# Patient Record
Sex: FEMALE | Race: BLACK OR AFRICAN AMERICAN | NOT HISPANIC OR LATINO | Employment: OTHER | URBAN - METROPOLITAN AREA
[De-identification: names, ages, dates, MRNs, and addresses within clinical notes are randomized per-mention and may not be internally consistent; named-entity substitution may affect disease eponyms.]

---

## 2017-03-13 ENCOUNTER — OFFICE VISIT (OUTPATIENT)
Dept: OPHTHALMOLOGY | Facility: CLINIC | Age: 72
End: 2017-03-13
Payer: COMMERCIAL

## 2017-03-13 DIAGNOSIS — H25.013 CORTICAL AGE-RELATED CATARACT OF BOTH EYES: ICD-10-CM

## 2017-03-13 DIAGNOSIS — E11.9 TYPE 2 DIABETES MELLITUS WITHOUT COMPLICATION, WITHOUT LONG-TERM CURRENT USE OF INSULIN: Primary | ICD-10-CM

## 2017-03-13 PROCEDURE — 99999 PR PBB SHADOW E&M-EST. PATIENT-LVL II: CPT | Mod: PBBFAC,,, | Performed by: OPHTHALMOLOGY

## 2017-03-13 PROCEDURE — 92014 COMPRE OPH EXAM EST PT 1/>: CPT | Mod: S$GLB,,, | Performed by: OPHTHALMOLOGY

## 2017-03-13 RX ORDER — ERGOCALCIFEROL 1.25 MG/1
50000 CAPSULE ORAL
COMMUNITY
Start: 2015-06-17 | End: 2019-01-15

## 2017-03-13 NOTE — PROGRESS NOTES
===============================  Radha Lao,   71 y.o. female   Last visit [facility]: :3/7/2016   Last visit eye dept. Visit date not found  VA:  Corrected distance visual acuity was 20/200 in the right eye and 20/60 in the left eye.  Tonometry     Tonometry (Applanation, 8:22 AM)      Right Left   Pressure 16 16                 Wearing Rx     Wearing Rx      Sphere Cylinder Axis Add   Right +1.00 +1.00 140 +2.50   Left +0.25 +1.25 040 +2.50       Type:  PAL              Manifest Refraction     Manifest Refraction      Sphere Cylinder Axis Dist   Right +0.50 +0.50 140 20/40   Left +1.25 +0.25 180 20/40                 Chief Complaint   Patient presents with    Diabetic Eye Exam     yearly diabetic exam        HPI     Diabetic Eye Exam    Additional comments: yearly diabetic exam           Comments   Dm x 11 yrs bs 106  No sx's  asymptomatic cataratcs Mild moderate  Dm -dr       Last edited by Anjelica Guthrie on 3/13/2017  8:00 AM. (History)      Read Studies:   Vitals    ________________  3/13/2017  1. Type 2 diabetes mellitus without complication, without long-term current use of insulin    2. Cortical age-related cataract of both eyes      No DR  Significant cataracts  Recommend Eval with Dr. Cotto next available  rtc with me 6 months Post CE       ===============================

## 2017-03-13 NOTE — MR AVS SNAPSHOT
[facility] - Ophthalmology  [address] [facility] [place] [address]  Phone: [phone]  Fax: [phone]                  Radha Lao   3/13/2017 8:15 AM   Office Visit    Description:  Female : 1945   Provider:  BLAYNE Christine MD   Department:  [facility] - Ophthalmology           Reason for Visit     Diabetic Eye Exam           Diagnoses this Visit        Comments    Type 2 diabetes mellitus without complication, without long-term current use of insulin    -  Primary     Cortical age-related cataract of both eyes                To Do List           Goals (5 Years of Data)     None      Follow-Up and Disposition     Return if symptoms worsen or fail to improve.      [facility] On Call     [facility] On Call Nurse Care Line -  Assistance  Registered nurses in the [facility] On Call Center provide clinical advisement, health education, appointment booking, and other advisory services.  Call for this free service at [phone].             Medications           Message regarding Medications     Verify the changes and/or additions to your medication regime listed below are the same as discussed with your clinician today.  If any of these changes or additions are incorrect, please notify your healthcare provider.             Verify that the below list of medications is an accurate representation of the medications you are currently taking.  If none reported, the list may be blank. If incorrect, please contact your healthcare provider. Carry this list with you in case of emergency.           Current Medications     ergocalciferol (ERGOCALCIFEROL) 50,000 unit Cap Take 50,000 Units by mouth.    glipiZIDE (GLUCOTROL) 5 MG TR24     lancets (ONE TOUCH ULTRASOFT LANCETS) Misc One touch ultra lancets Test qd    losartan (COZAAR) 100 MG tablet     metformin (GLUCOPHAGE) 500 MG tablet     multivit-min-iron-CA-FA 27-0.4 mg Tab Take by mouth.    multivit-min-iron-CA-FA 27-0.4 mg Tab Take by mouth.    pravastatin  (PRAVACHOL) 40 MG tablet     pravastatin (PRAVACHOL) 40 MG tablet Take 40 mg by mouth.    estradiol (ESTRACE) 0.01 % (0.1 mg/gram) vaginal cream Place 2 g vaginally.           Clinical Reference Information           Allergies as of 3/13/2017     Iodine And Iodide Containing Products    Other Omega-3s      Immunizations Administered on Date of Encounter - 3/13/2017     None      Language Assistance Services     ATTENTION: Language assistance services are available, free of charge. Please call [phone].      ATENCIÓN: Si hong castillo, tiene a clifford disposición servicios gratuitos de asistencia lingüística. Llame al [phone].     [facility] Ý: N?u b?n nói Ti?ng Vi?t, có các d?ch v? h? tr? ngôn ng? mi?n phí dành cho b?n. G?i s? [phone].         [facility] - Ophthalmology complies with applicable Federal civil rights laws and does not discriminate on the basis of race, color, national origin, age, disability, or sex.

## 2017-03-27 ENCOUNTER — OFFICE VISIT (OUTPATIENT)
Dept: OPHTHALMOLOGY | Facility: CLINIC | Age: 72
End: 2017-03-27
Payer: COMMERCIAL

## 2017-03-27 DIAGNOSIS — H25.13 NUCLEAR SCLEROSIS, BILATERAL: Primary | ICD-10-CM

## 2017-03-27 DIAGNOSIS — E11.9 DIABETES MELLITUS TYPE 2 WITHOUT RETINOPATHY: ICD-10-CM

## 2017-03-27 PROCEDURE — 92014 COMPRE OPH EXAM EST PT 1/>: CPT | Mod: S$GLB,,, | Performed by: OPHTHALMOLOGY

## 2017-03-27 PROCEDURE — 99999 PR PBB SHADOW E&M-EST. PATIENT-LVL I: CPT | Mod: PBBFAC,,, | Performed by: OPHTHALMOLOGY

## 2017-03-27 NOTE — PROGRESS NOTES
SUBJECTIVE:   Radha Lao is a 71 y.o. female   Corrected distance visual acuity was 20/200+1 in the right eye and 20/60 in the left eye.Corrected near visual acuity was J3 using both eyes.   Chief Complaint   Patient presents with    Cataract Eval     ref by [facility]        HPI:  HPI     Cataract Eval    Additional comments: ref by [facility]           Comments   Patient states her VA is blurry, but still comfortable to her. No problems   driving at night and no glare issues. BS stays around 100. No pain /   irritation. No gtts. Patient is not sure if she is ready for cataract sx.   Wants to talk to CPG more. Will get ASCAN done in case patient does want   to go on with sx.    Dm x 11 yrs bs 106  No sx's  asymptomatic cataratcs Mild moderate  Dm -dr       Last edited by Robbin Aguayo MA on 3/27/2017  8:53 AM. (History)        Assessment /Plan :  1. Nuclear sclerosis, bilateral discussed RBA of cataract extraction OD, patient will consider the surgery   2. Diabetes mellitus type 2 without retinopathy No diabetic retinopathy at this time. Reviewed diabetic eye precautions including avoiding tobacco use,  Good glucose control, and importance of regular follow up.        Patient will call to schedule an appointment

## 2018-11-19 ENCOUNTER — OFFICE VISIT (OUTPATIENT)
Dept: OPHTHALMOLOGY | Facility: CLINIC | Age: 73
End: 2018-11-19
Payer: COMMERCIAL

## 2018-11-19 DIAGNOSIS — H25.12 NUCLEAR SENILE CATARACT OF LEFT EYE: ICD-10-CM

## 2018-11-19 DIAGNOSIS — H25.11 NUCLEAR SENILE CATARACT OF RIGHT EYE: Primary | ICD-10-CM

## 2018-11-19 DIAGNOSIS — E11.9 DIABETES MELLITUS TYPE 2 WITHOUT RETINOPATHY: ICD-10-CM

## 2018-11-19 PROCEDURE — 92136 OPHTHALMIC BIOMETRY: CPT | Mod: RT,S$GLB,, | Performed by: OPHTHALMOLOGY

## 2018-11-19 PROCEDURE — 92014 COMPRE OPH EXAM EST PT 1/>: CPT | Mod: S$GLB,,, | Performed by: OPHTHALMOLOGY

## 2018-11-19 PROCEDURE — 99999 PR PBB SHADOW E&M-EST. PATIENT-LVL II: CPT | Mod: PBBFAC,,, | Performed by: OPHTHALMOLOGY

## 2018-11-19 RX ORDER — KETOROLAC TROMETHAMINE 5 MG/ML
1 SOLUTION OPHTHALMIC 4 TIMES DAILY
Qty: 1 BOTTLE | Refills: 2 | Status: SHIPPED | OUTPATIENT
Start: 2018-11-19 | End: 2019-01-15

## 2018-11-19 RX ORDER — PREDNISOLONE ACETATE 10 MG/ML
1 SUSPENSION/ DROPS OPHTHALMIC 4 TIMES DAILY
Qty: 1 BOTTLE | Refills: 2 | Status: SHIPPED | OUTPATIENT
Start: 2018-11-19 | End: 2019-01-15 | Stop reason: ALTCHOICE

## 2018-11-19 RX ORDER — GATIFLOXACIN 5 MG/ML
1 SOLUTION/ DROPS OPHTHALMIC 2 TIMES DAILY
Qty: 1 BOTTLE | Refills: 2 | Status: SHIPPED | OUTPATIENT
Start: 2018-11-19 | End: 2019-01-15 | Stop reason: ALTCHOICE

## 2018-11-19 NOTE — PROGRESS NOTES
SUBJECTIVE:   Radha Lao is a 73 y.o. female   Corrected distance visual acuity was CF at 5' in the right eye and 20/50 +1 in the left eye.   Chief Complaint   Patient presents with    Cataract        HPI:  HPI     Pt here for cataract eval. She says that every now and then, she feels   some burning and itching above her right eye. Pt states that she has   noticed that her right eye has gotten weaker since her last appt. She says   when she closes her left eye, she notices that her left eye is the one   that she can really see out of. No gtts.     Dm x 11 yrs bs 106  No sx's  asymptomatic cataratcs Mild moderate  Dm -dr    Last edited by Alexi Cook, Patient Care Assistant on 11/19/2018 10:12   AM. (History)        Assessment /Plan :  1. Nuclear senile cataract of right eye  Visually Significant Cataract OD > OS:   Patient reports decreased vision consistent with the clinical amount of lenticular opacity,  which reaches the level of visual significance and affects activities of daily living such as  drive. Risks, benefits, and alternatives to cataract surgery were  discussed.  IOL options were discussed, including Premium IOL'S and the associated  side effects and additional patient cost associated with them as well as patient's visual  goals. The pt expressed a desire to proceed with surgery with the potential for some  reasonable degree of visual improvement and was consented.  Risks of loss of vision and eye reviewed as well as possibility of need for spectacle correction after surgery even with premium implants. Verbal and written preop  instructions were provided to the patient.       Pt is interested in traditional monofocal IOL aiming for:    Distance OU and understands that glasses will be generally needed at all times for near vision and often for finer distance correction especially for higher degrees of astigmatism.       Final visual acuity may be limited by diabetes    Pt wishes to have  Phaco/IOL  OD     Requests a Monofocal IOL.    Will aim for distance    Other considerations: Corneal Precautions, complex, trypan blue                 2. Nuclear senile cataract of left eye -follow, will do OD first.    3. Diabetes mellitus type 2 without retinopathy No diabetic retinopathy at this time. Reviewed diabetic eye precautions including avoiding tobacco use,  Good glucose control, and importance of regular follow up.            RTC for phaco OD

## 2018-11-23 ENCOUNTER — TELEPHONE (OUTPATIENT)
Dept: OPHTHALMOLOGY | Facility: CLINIC | Age: 73
End: 2018-11-23

## 2018-11-23 NOTE — TELEPHONE ENCOUNTER
----- Message from Sandee Hirsch MA sent at 11/23/2018  4:04 PM CST -----  Contact: Pt   Please call Ms. Lao #[phone], she wants to know how to take Pre Op eye drops.    Thank you,  Sandee

## 2018-11-23 NOTE — TELEPHONE ENCOUNTER
----- Message from Sandee Hirsch MA sent at 11/23/2018  4:04 PM CST -----  Contact: Pt   Please call Ms. Lao #[phone], she wants to know how to take Pre Op eye drops.    Thank you,  Sandee

## 2018-12-05 ENCOUNTER — OUTSIDE PLACE OF SERVICE (OUTPATIENT)
Dept: ADMINISTRATIVE | Facility: OTHER | Age: 73
End: 2018-12-05
Payer: COMMERCIAL

## 2018-12-05 PROCEDURE — 66982 XCAPSL CTRC RMVL CPLX WO ECP: CPT | Mod: RT,,, | Performed by: OPHTHALMOLOGY

## 2018-12-06 ENCOUNTER — OFFICE VISIT (OUTPATIENT)
Dept: OPHTHALMOLOGY | Facility: CLINIC | Age: 73
End: 2018-12-06
Payer: COMMERCIAL

## 2018-12-06 DIAGNOSIS — Z98.890 POST-OPERATIVE STATE: Primary | ICD-10-CM

## 2018-12-06 PROCEDURE — 99024 POSTOP FOLLOW-UP VISIT: CPT | Mod: S$GLB,,, | Performed by: OPHTHALMOLOGY

## 2018-12-06 PROCEDURE — 99999 PR PBB SHADOW E&M-EST. PATIENT-LVL II: CPT | Mod: PBBFAC,,, | Performed by: OPHTHALMOLOGY

## 2018-12-13 ENCOUNTER — OFFICE VISIT (OUTPATIENT)
Dept: OPHTHALMOLOGY | Facility: CLINIC | Age: 73
End: 2018-12-13
Payer: COMMERCIAL

## 2018-12-13 DIAGNOSIS — Z98.890 POST-OPERATIVE STATE: Primary | ICD-10-CM

## 2018-12-13 PROCEDURE — 99024 POSTOP FOLLOW-UP VISIT: CPT | Mod: S$GLB,,, | Performed by: OPHTHALMOLOGY

## 2018-12-13 PROCEDURE — 99999 PR PBB SHADOW E&M-EST. PATIENT-LVL II: CPT | Mod: PBBFAC,,, | Performed by: OPHTHALMOLOGY

## 2018-12-13 NOTE — PROGRESS NOTES
SUBJECTIVE:   Radha Lao is a 73 y.o. female   Uncorrected distance visual acuity was 20/30 in the right eye and not recorded in the left eye.   Chief Complaint   Patient presents with    Post-op Evaluation        HPI:  HPI     Patient returns for a week p.o. Visit patient states she is seeing in   high definition, patient is undecided if shes ready to book OS.        Dm x 11 yrs bs 106    PCIOL OD +23.0 SN60WF (distance) 12-5-18  Cat OS    OD Pred Acet. Qid , Ket Qid, Gat Bid           Last edited by VIKRAM Monson on 12/13/2018  1:14 PM. (History)        Assessment /Plan :  1. Post-operative state Slit lamp exam:  L/L: nl  K: clear, wound sealed  AC: 0 cell and flare  Iris/Lens: IOL centered and stable      IMP:  PO Week 1 S/P Phaco/ IOL OD : Doing well with no evidence of infection or abnormal inflammation.     Plan:  Pt given and instructed in one week PO instructions. D/C zymaxid and start to taper off Ketorlac and Pred Forte 1% weekly. Can resume normal activitites and d/c eye shield. OTC reading glasses can be used until evaluated for final MR. Follow up in one month with Dr. Christine or PRN pain, redness, vision loss, or other concerns.

## 2019-01-15 ENCOUNTER — OFFICE VISIT (OUTPATIENT)
Dept: OPHTHALMOLOGY | Facility: CLINIC | Age: 74
End: 2019-01-15
Payer: COMMERCIAL

## 2019-01-15 DIAGNOSIS — H25.12 AGE-RELATED NUCLEAR CATARACT OF LEFT EYE: ICD-10-CM

## 2019-01-15 DIAGNOSIS — E11.9 TYPE 2 DIABETES MELLITUS WITHOUT COMPLICATION, WITHOUT LONG-TERM CURRENT USE OF INSULIN: Primary | ICD-10-CM

## 2019-01-15 PROCEDURE — 99999 PR PBB SHADOW E&M-EST. PATIENT-LVL II: ICD-10-PCS | Mod: PBBFAC,,, | Performed by: OPHTHALMOLOGY

## 2019-01-15 PROCEDURE — 92014 COMPRE OPH EXAM EST PT 1/>: CPT | Mod: S$GLB,,, | Performed by: OPHTHALMOLOGY

## 2019-01-15 PROCEDURE — 99999 PR PBB SHADOW E&M-EST. PATIENT-LVL II: CPT | Mod: PBBFAC,,, | Performed by: OPHTHALMOLOGY

## 2019-01-15 PROCEDURE — 92014 PR EYE EXAM, EST PATIENT,COMPREHESV: ICD-10-PCS | Mod: S$GLB,,, | Performed by: OPHTHALMOLOGY

## 2019-01-15 NOTE — PROGRESS NOTES
===============================  01/15/2019   Radha Lao,   73 y.o. female   Last visit [facility]: :3/13/2017   Last visit eye dept. Visit date not found  VA:  Uncorrected distance visual acuity was 20/20 in the right eye and 20/50 in the left eye.  Tonometry     Tonometry (Applanation, 2:09 PM)       Right Left    Pressure 18 20               Not recorded        Manifest Refraction     Manifest Refraction       Sphere Cylinder Axis Dist VA    Right -0.50   20/20    Left -0.25 +1.25 038 20/25              Chief Complaint   Patient presents with    Diabetes     S/P CAT SX OD WITH CPG 12-5-18    POST IOP     vision is good per pt     Ophthalmic Medications     Ophthalmic - Anti-inflammatory, Glucocorticoids Start End    prednisoLONE acetate (PRED FORTE) 1 % DrpS (Discontinued) 11/19/2018 1/15/2019    Sig: Place 1 drop into the right eye 4 (four) times daily. Start one day before surgery    Route: Right Eye    Reason for Discontinue: Therapy completed    Ophthalmic - Anti-inflammatory, NSAIDs Start End    ketorolac 0.5% (ACULAR) 0.5 % Drop (Discontinued) 11/19/2018 1/15/2019    Sig: Place 1 drop into the right eye 4 (four) times daily. Eyedrops to start one day before surgery    Route: Right Eye    Reason for Discontinue: Patient no longer taking         HPI     Diabetes      Additional comments: S/P CAT SX OD WITH CPG 12-5-18              POST IOP      Additional comments: vision is good per pt              Comments     Dm since 2006  NO DBR  PCIOL OD +23.0 SN60WF (distance) 12-5-18  Cat OS          Last edited by SWETHA Alcantara on 1/15/2019  1:52 PM. (History)          ________________  1/15/2019  Problem List Items Addressed This Visit        Eye/Vision problems    Diabetes mellitus - Primary    Age-related nuclear cataract of left eye        Dm 1 2years   recent od c e   following os  cat  Oct go hola  Very pleased with 5 week post op ce  3+ vac cat OS, follow for now  rtc with me in 6 m      .        ===========================

## 2019-07-16 ENCOUNTER — OFFICE VISIT (OUTPATIENT)
Dept: OPHTHALMOLOGY | Facility: CLINIC | Age: 74
End: 2019-07-16
Payer: COMMERCIAL

## 2019-07-16 DIAGNOSIS — H25.812 COMBINED FORMS OF AGE-RELATED CATARACT OF LEFT EYE: ICD-10-CM

## 2019-07-16 DIAGNOSIS — E11.9 TYPE 2 DIABETES MELLITUS WITHOUT COMPLICATION, WITHOUT LONG-TERM CURRENT USE OF INSULIN: Primary | ICD-10-CM

## 2019-07-16 DIAGNOSIS — Z96.1 PSEUDOPHAKIA OF RIGHT EYE: ICD-10-CM

## 2019-07-16 PROCEDURE — 92014 COMPRE OPH EXAM EST PT 1/>: CPT | Mod: S$GLB,,, | Performed by: OPHTHALMOLOGY

## 2019-07-16 PROCEDURE — 99999 PR PBB SHADOW E&M-EST. PATIENT-LVL II: ICD-10-PCS | Mod: PBBFAC,,, | Performed by: OPHTHALMOLOGY

## 2019-07-16 PROCEDURE — 92014 PR EYE EXAM, EST PATIENT,COMPREHESV: ICD-10-PCS | Mod: S$GLB,,, | Performed by: OPHTHALMOLOGY

## 2019-07-16 PROCEDURE — 99999 PR PBB SHADOW E&M-EST. PATIENT-LVL II: CPT | Mod: PBBFAC,,, | Performed by: OPHTHALMOLOGY

## 2019-07-16 RX ORDER — TRIAMCINOLONE ACETONIDE 1 MG/G
CREAM TOPICAL
COMMUNITY
Start: 2019-06-10 | End: 2020-06-09

## 2019-07-16 NOTE — PROGRESS NOTES
===============================  Radha Lao,   73 y.o. female   Last visit [facility]: :1/15/2019   Last visit eye dept. Visit date not found  VA:  Uncorrected distance visual acuity was 20/25 in the right eye and 20/50 in the left eye.  Tonometry     Tonometry (Applanation, 1:35 PM)       Right Left    Pressure 14 14               Not recorded         Not recorded         Not recorded        Chief Complaint   Patient presents with    Diabetes     6 months diabetic check up          ________________  7/16/2019  HPI     Diabetes      Additional comments: 6 months diabetic check up              Comments     Dm since 2006  NO DBR  PCIOL OD +23.0 SN60WF (distance) 12-5-18  Cat OS          Last edited by Anjelica Guthrie on 7/16/2019  1:26 PM. (History)      Problem List Items Addressed This Visit        Eye/Vision problems    Diabetes mellitus - Primary    Combined forms of age-related cataract of left eye    Pseudophakia of right eye          .stable  Significant cataract, asymptomatic, follow  No bdr  rtc 1 year       ===========================

## 2020-07-14 ENCOUNTER — OFFICE VISIT (OUTPATIENT)
Dept: OPHTHALMOLOGY | Facility: CLINIC | Age: 75
End: 2020-07-14
Payer: COMMERCIAL

## 2020-07-14 DIAGNOSIS — H25.812 COMBINED FORMS OF AGE-RELATED CATARACT OF LEFT EYE: ICD-10-CM

## 2020-07-14 DIAGNOSIS — E11.36 CONTROLLED TYPE 2 DIABETES MELLITUS WITH DIABETIC CATARACT, WITHOUT LONG-TERM CURRENT USE OF INSULIN: Primary | ICD-10-CM

## 2020-07-14 PROCEDURE — 99999 PR PBB SHADOW E&M-EST. PATIENT-LVL II: CPT | Mod: PBBFAC,,, | Performed by: OPHTHALMOLOGY

## 2020-07-14 PROCEDURE — 92014 COMPRE OPH EXAM EST PT 1/>: CPT | Mod: S$GLB,,, | Performed by: OPHTHALMOLOGY

## 2020-07-14 PROCEDURE — 92014 PR EYE EXAM, EST PATIENT,COMPREHESV: ICD-10-PCS | Mod: S$GLB,,, | Performed by: OPHTHALMOLOGY

## 2020-07-14 PROCEDURE — 99999 PR PBB SHADOW E&M-EST. PATIENT-LVL II: ICD-10-PCS | Mod: PBBFAC,,, | Performed by: OPHTHALMOLOGY

## 2020-07-14 RX ORDER — AMLODIPINE BESYLATE 5 MG/1
5 TABLET ORAL
COMMUNITY
Start: 2020-01-13 | End: 2021-01-12

## 2020-07-14 NOTE — PROGRESS NOTES
===============================  Radha Lao,  7/14/2020 today   74 y.o. female   Last visit [facility]: :7/16/2019   Last visit eye dept. Visit date not found  VA:  Uncorrected distance visual acuity was 20/20 in the right eye and 20/50 in the left eye.  Tonometry     Tonometry (Applanation, 2:03 PM)       Right Left    Pressure 21 21               Not recorded         Not recorded         Not recorded        Chief Complaint   Patient presents with    Diabetic Eye Exam     pt states her va is good, here for yrly dm eye exam    cataracts       ________________  7/14/2020 today  HPI     Diabetic Eye Exam      Additional comments: pt states her va is good, here for yr dm eye exam                Comments     Dm since 2006  NO DBR  PCIOL OD +23.0 SN60WF (distance) 12-5-18  Cat OS          Last edited by SWETHA Alcantara on 7/14/2020  2:00 PM. (History)      Problem List Items Addressed This Visit        Eye/Vision problems    Combined forms of age-related cataract of left eye      Other Visit Diagnoses     Controlled type 2 diabetes mellitus with diabetic cataract, without long-term current use of insulin    -  Primary          .significant cataract os but asymptomatic  pciol od trace pcm  Follow  No BDr  rtc 1 year      ===========================

## 2021-07-26 ENCOUNTER — OFFICE VISIT (OUTPATIENT)
Dept: OPHTHALMOLOGY | Facility: CLINIC | Age: 76
End: 2021-07-26
Payer: MEDICARE

## 2021-07-26 DIAGNOSIS — E11.36 CONTROLLED TYPE 2 DIABETES MELLITUS WITH DIABETIC CATARACT, WITHOUT LONG-TERM CURRENT USE OF INSULIN: Primary | ICD-10-CM

## 2021-07-26 PROCEDURE — 92014 PR EYE EXAM, EST PATIENT,COMPREHESV: ICD-10-PCS | Mod: S$GLB,,, | Performed by: OPHTHALMOLOGY

## 2021-07-26 PROCEDURE — 99999 PR PBB SHADOW E&M-EST. PATIENT-LVL III: CPT | Mod: PBBFAC,,, | Performed by: OPHTHALMOLOGY

## 2021-07-26 PROCEDURE — 99213 OFFICE O/P EST LOW 20 MIN: CPT | Mod: PBBFAC | Performed by: OPHTHALMOLOGY

## 2021-07-26 PROCEDURE — 92014 COMPRE OPH EXAM EST PT 1/>: CPT | Mod: S$GLB,,, | Performed by: OPHTHALMOLOGY

## 2021-07-26 PROCEDURE — 99999 PR PBB SHADOW E&M-EST. PATIENT-LVL III: ICD-10-PCS | Mod: PBBFAC,,, | Performed by: OPHTHALMOLOGY

## 2022-09-19 NOTE — PROGRESS NOTES
===============================  Date today is 9/20/2022  Radha Lao is a 77 y.o. female  Last visit [facility]: :7/26/2021   Last visit eye dept. Visit date not found    Uncorrected distance visual acuity was 20/25 in the right eye and 20/50 in the left eye.  Tonometry       Tonometry (Tonopen, 10:24 AM)         Right Left    Pressure 16 17                  Not recorded       Manifest Refraction       Manifest Refraction         Sphere Cylinder Axis Dist VA    Right        Left +0.50 +0.25 037 20/50-                  Not recorded       Chief Complaint   Patient presents with    Diabetic Eye Exam     HPI    Diagnosed with diabetes in   No results found for: LABA1C, HGBA1C  Pt states glasses are only good for reading now and would like a new rx  Pt states no visual co's  Pt uses refresh prn ou  No pain          Last edited by Tank Marte on 9/20/2022 10:10 AM.      Problem List Items Addressed This Visit    None  Visit Diagnoses       Controlled type 2 diabetes mellitus without complication, without long-term current use of insulin    -  Primary    Diabetic cataract of left eye        Cataract extraction status of eye, right              Instructed to call 24/7 for any worsening of vision, visual distortion or pain.  Check OU independently daily.    Gave my office and personal cell phone number.  ________________  9/20/2022 today  Radha Lao  :  DM no retinopathy   OCT no change  2+ cortical/vacuole cataract OS- asymptomatic  PCIOL OD  C/d 0.35 OU  Macula looks good    RTC 1 year  Instructed to call 24/7 for any worsening of vision or symptoms. Check OU daily.   Gave my office and cell phone number.      =============================

## 2022-09-20 ENCOUNTER — OFFICE VISIT (OUTPATIENT)
Dept: OPHTHALMOLOGY | Facility: CLINIC | Age: 77
End: 2022-09-20
Payer: MEDICARE

## 2022-09-20 DIAGNOSIS — Z98.41 CATARACT EXTRACTION STATUS OF EYE, RIGHT: ICD-10-CM

## 2022-09-20 DIAGNOSIS — E11.9 CONTROLLED TYPE 2 DIABETES MELLITUS WITHOUT COMPLICATION, WITHOUT LONG-TERM CURRENT USE OF INSULIN: Primary | ICD-10-CM

## 2022-09-20 DIAGNOSIS — E11.36 DIABETIC CATARACT OF LEFT EYE: ICD-10-CM

## 2022-09-20 PROCEDURE — 99999 PR PBB SHADOW E&M-EST. PATIENT-LVL I: CPT | Mod: PBBFAC,,, | Performed by: OPHTHALMOLOGY

## 2022-09-20 PROCEDURE — 1159F PR MEDICATION LIST DOCUMENTED IN MEDICAL RECORD: ICD-10-PCS | Mod: CPTII,S$GLB,, | Performed by: OPHTHALMOLOGY

## 2022-09-20 PROCEDURE — 92014 PR EYE EXAM, EST PATIENT,COMPREHESV: ICD-10-PCS | Mod: S$GLB,,, | Performed by: OPHTHALMOLOGY

## 2022-09-20 PROCEDURE — 1159F MED LIST DOCD IN RCRD: CPT | Mod: CPTII,S$GLB,, | Performed by: OPHTHALMOLOGY

## 2022-09-20 PROCEDURE — 1160F RVW MEDS BY RX/DR IN RCRD: CPT | Mod: CPTII,S$GLB,, | Performed by: OPHTHALMOLOGY

## 2022-09-20 PROCEDURE — 1160F PR REVIEW ALL MEDS BY PRESCRIBER/CLIN PHARMACIST DOCUMENTED: ICD-10-PCS | Mod: CPTII,S$GLB,, | Performed by: OPHTHALMOLOGY

## 2022-09-20 PROCEDURE — 99999 PR PBB SHADOW E&M-EST. PATIENT-LVL I: ICD-10-PCS | Mod: PBBFAC,,, | Performed by: OPHTHALMOLOGY

## 2022-09-20 PROCEDURE — 92014 COMPRE OPH EXAM EST PT 1/>: CPT | Mod: S$GLB,,, | Performed by: OPHTHALMOLOGY

## 2023-09-26 ENCOUNTER — OFFICE VISIT (OUTPATIENT)
Dept: OPHTHALMOLOGY | Facility: CLINIC | Age: 78
End: 2023-09-26
Payer: MEDICARE

## 2023-09-26 DIAGNOSIS — Z98.41 CATARACT EXTRACTION STATUS OF EYE, RIGHT: ICD-10-CM

## 2023-09-26 DIAGNOSIS — E11.36 DIABETIC CATARACT OF LEFT EYE: ICD-10-CM

## 2023-09-26 DIAGNOSIS — E11.9 CONTROLLED TYPE 2 DIABETES MELLITUS WITHOUT COMPLICATION, WITHOUT LONG-TERM CURRENT USE OF INSULIN: Primary | ICD-10-CM

## 2023-09-26 PROCEDURE — 2023F DILAT RTA XM W/O RTNOPTHY: CPT | Mod: CPTII,S$GLB,, | Performed by: OPHTHALMOLOGY

## 2023-09-26 PROCEDURE — 1159F PR MEDICATION LIST DOCUMENTED IN MEDICAL RECORD: ICD-10-PCS | Mod: CPTII,S$GLB,, | Performed by: OPHTHALMOLOGY

## 2023-09-26 PROCEDURE — 1160F PR REVIEW ALL MEDS BY PRESCRIBER/CLIN PHARMACIST DOCUMENTED: ICD-10-PCS | Mod: CPTII,S$GLB,, | Performed by: OPHTHALMOLOGY

## 2023-09-26 PROCEDURE — 92014 COMPRE OPH EXAM EST PT 1/>: CPT | Mod: S$GLB,,, | Performed by: OPHTHALMOLOGY

## 2023-09-26 PROCEDURE — 99999 PR PBB SHADOW E&M-EST. PATIENT-LVL I: CPT | Mod: PBBFAC,,, | Performed by: OPHTHALMOLOGY

## 2023-09-26 PROCEDURE — 99999 PR PBB SHADOW E&M-EST. PATIENT-LVL I: ICD-10-PCS | Mod: PBBFAC,,, | Performed by: OPHTHALMOLOGY

## 2023-09-26 PROCEDURE — 1160F RVW MEDS BY RX/DR IN RCRD: CPT | Mod: CPTII,S$GLB,, | Performed by: OPHTHALMOLOGY

## 2023-09-26 PROCEDURE — 2023F PR DILATED RETINAL EXAM W/O EVID OF RETINOPATHY: ICD-10-PCS | Mod: CPTII,S$GLB,, | Performed by: OPHTHALMOLOGY

## 2023-09-26 PROCEDURE — 1159F MED LIST DOCD IN RCRD: CPT | Mod: CPTII,S$GLB,, | Performed by: OPHTHALMOLOGY

## 2023-09-26 PROCEDURE — 92014 PR EYE EXAM, EST PATIENT,COMPREHESV: ICD-10-PCS | Mod: S$GLB,,, | Performed by: OPHTHALMOLOGY

## 2023-09-26 NOTE — PROGRESS NOTES
===============================  Date today is 9/26/2023  Radha Lao is a 78 y.o. female  Last visit [facility]: :9/20/2022   Last visit eye dept. Visit date not found    Uncorrected distance visual acuity was 20/25 in the right eye and 20/50- in the left eye.  Tonometry       Tonometry (Applanation, 1:27 PM)         Right Left    Pressure 18 18                  Not recorded       Manifest Refraction       Manifest Refraction         Sphere Cylinder Axis Dist VA    Right +0.25 +0.25 180 20/20    Left -0.25 +1.25 038 20/40-2                  Not recorded       Chief Complaint   Patient presents with    Diabetic Eye Exam     Yearly      HPI     Diabetic Eye Exam     Additional comments: Yearly            Comments    States that her vision is stable and denies any new ocular issues at this   time. Maybe be considering cat sx for her os if it looks like its ready.    Dm since 2006  NO DBR  PCIOL OD +23.0 SN60WF (distance) 12-5-18  Cat OS            Last edited by Ambar Bland on 9/26/2023  1:32 PM.      Problem List Items Addressed This Visit    None  Visit Diagnoses       Controlled type 2 diabetes mellitus without complication, without long-term current use of insulin    -  Primary    Diabetic cataract of left eye        Cataract extraction status of eye, right              Instructed to call 24/7 for any worsening of vision, visual distortion or pain.  Check OU independently daily.    Gave my office and personal cell phone number.  ________________  9/26/2023 today  Radha Lao    :  DM no retinopathy  OCT ok OU  PCIOL OD  2+ cortical cataract OS- asymptomatic  Macula looks good  No macular degeneration  C/d 0.35 OU    RTC 1 year  Instructed to call 24/7 for any worsening of vision or symptoms. Check OU daily.   Gave my office and cell phone number.    =============================